# Patient Record
Sex: FEMALE | Race: WHITE | Employment: FULL TIME | URBAN - NONMETROPOLITAN AREA
[De-identification: names, ages, dates, MRNs, and addresses within clinical notes are randomized per-mention and may not be internally consistent; named-entity substitution may affect disease eponyms.]

---

## 2020-09-17 ENCOUNTER — TELEPHONE (OUTPATIENT)
Dept: PULMONOLOGY | Age: 55
End: 2020-09-17

## 2020-09-23 NOTE — PROGRESS NOTES
[facility] initial consultation note    Karan Turner                                             Chief Complaint: Gurinder Vicente is here for a sleep consult. She was referred by Dr. Juliocesar Lemus for snoring. No prior studies.         Chief complaint: Karan Turner is a 48 y. o.oldfemale came for further evaluation regarding her ?sleep apnea  with referral from Dr. Dumont Dec:    Sleep/Wake schedule:  Usual time to go to bed during the work/regular day of week: 9:30 PM.  Usual time to wake up during the work//regular day of week: 4:45 AM. She has to drive to [employer] ~[address] to attend her job. Over the weekends her sleep schedule:  [x]phase delayed. She feels the same on the weekends despite sleeping long time. She usually falls a sleep in less than: 10 to 30 minutes. She takes naps: Yes. Number of naps per week:  1 time. During each nap she spends a total of: 1hour. The naps were reported as refreshing: No- some times. Sleep Hygiene:    Is the temperature and evironment in her bed room is acceptable to her: Yes. She watches Television in her bed room: Yes- some times  She read books, study, pay bills etc in the bed: No.  Frequency She wake up during night/sleep: 2  Majority of nocturnal awakenings are for urination: Yes. Difficulty in falling back to sleep after nocturnal awakenings: Yes- some times  . Do you drink coffee: No.   Do you drink caffeinated beverages i.e sodas: Yes. 2 can/s per day. Do you drink tea:No.      Do you drink alcoholic beverages: No.   History of recreational drug use: No.     History of tobacco smoking:No.      Sleep apnea symptoms:  Noticed to have loud snoring:Yes. Noted by her family member- spouse  Witnessed apneas during sleep noticed: Yes. Noted by her family member- spouse. History of choking and gasping sensation at night time: Yes.   History of headaches in the (LEXAPRO) 10 MG tablet Take 10 mg by mouth daily       No current facility-administered medications for this visit. No family history on file. Review of Systems:   General/Constitutional: No recent loss of weight or appetite changes. No fever or chills. HENT: Negative. Eyes: Negative. Upper respiratory tract: No nasal stuffiness but she gives a hx of post nasal drip. Lower respiratory tract/ lungs: No cough or sputum production. No hemoptysis. Cardiovascular: No palpitations or chest pain. Gastrointestinal: No nausea or vomiting. Neurological: No focal neurologiacal weakness. Extremities: No edema. Musculoskeletal: No complaints. Genitourinary: No complaints. Hematological: Negative. Psychiatric/Behavioral: Negative. Skin: No itching. /70 (Site: Right Upper Arm, Position: Sitting, Cuff Size: Medium Adult)   Pulse 64   Ht 5' 9\" (1.753 m)   Wt 140 lb 9.6 oz (63.8 kg)   SpO2 98% Comment: on room air at rest  BMI 20.76 kg/m²   Mallampati airway Class:III  Neck Circumference: 12.75 Inches  [place] sleepiness score 10/15/20: 11  SAQLI: 64    Physical Exam   Nursing note and vitals reviewed. Constitutional: Patient appears moderately built and moderately nourished. No distress. Patient is oriented to person, place, and time. HENT:   Head: Normocephalic and atraumatic. Right Ear: External ear normal.   Left Ear: External ear normal.   Mouth/Throat: Oropharynx is clear and moist.  No oral thrush. Eyes: Conjunctivae are normal. Pupils are equal, round, and reactive to light. No scleral icterus. Neck: Neck supple. No JVD present. No tracheal deviation present. Cardiovascular: Normal rate, regular rhythm, normal heart sounds. No murmur heard. Pulmonary/Chest: Effort normal and breath sounds normal. No stridor. No respiratory distress. No wheezes. No rales. Patient exhibits no tenderness. Abdominal: Soft. Patient exhibits no distension. No tenderness.    Musculoskeletal: Normal range of motion. Extremities: Patient exhibits no edema and no tenderness. Lymphadenopathy:  No cervical adenopathy. Neurological: Patient is alert and oriented to person, place, and time. Skin: Skin is warm and dry. Patient is not diaphoretic. Psychiatric: Patient  has a normal mood and affect. Patient behavior is normal.     Diagnostic Data:  None related sleep. Assessment:  -Snoring with witnessed apneas,frequent nocturnal awakenings and excessive daytime sleepiness to evaluate for obstructive sleep apnea. -Inadequate sleep hygiene. -Anxiety disorder on meds- She follows with her family physician.  -Hypersomnia ( Excessive daytime sleepiness) may be due to obstructive sleep apnea Vs Inadequate sleep hygiene. Recommendations/Plan:  -Will schedule patient for polysomnogram in the sleep lab at [facility] sleep lab due to low suspicion for sleep apnea. -I had a discussion with patient regarding avialable treatment options for her sleep disorder breathing including but not limited to CPAP titration in the sleep lab Vs.Dental appliance placement with referral to a local dentist Vs other available surgical options including Uvulopalatopharyngoplasty, maxillomandibular ostomy and tracheostomy as last option. At the end of discussion, she is not decided on her   treatment if she found to have obstructive sleep apnea at this time.  -We will see Aster Hu back in 1week after the sleep study to go over the sleep study results and further management options.  -She was educated to practice good sleep hygiene practices. She was provided with a good sleep hygiene hand out.  -Jem FANG was advised to make earlier appointment with my clinic if she develops any worsening of sleep symptoms. She verbalizes understanding.  -Jem FANG was advised to not to drive any motor vehicles or operate heavy equipment until her sleep symptoms are under good control. Aster Hu verbalizes understanding.  - Aster Hu was educated about my impression and plan. She verbalizes understanding.

## 2020-10-15 ENCOUNTER — INITIAL CONSULT (OUTPATIENT)
Dept: PULMONOLOGY | Age: 55
End: 2020-10-15
Payer: COMMERCIAL

## 2020-10-15 VITALS
SYSTOLIC BLOOD PRESSURE: 118 MMHG | WEIGHT: 140.6 LBS | HEIGHT: 69 IN | BODY MASS INDEX: 20.83 KG/M2 | OXYGEN SATURATION: 98 % | HEART RATE: 64 BPM | DIASTOLIC BLOOD PRESSURE: 70 MMHG

## 2020-10-15 PROCEDURE — G8484 FLU IMMUNIZE NO ADMIN: HCPCS | Performed by: INTERNAL MEDICINE

## 2020-10-15 PROCEDURE — G8427 DOCREV CUR MEDS BY ELIG CLIN: HCPCS | Performed by: INTERNAL MEDICINE

## 2020-10-15 PROCEDURE — G8420 CALC BMI NORM PARAMETERS: HCPCS | Performed by: INTERNAL MEDICINE

## 2020-10-15 PROCEDURE — 1036F TOBACCO NON-USER: CPT | Performed by: INTERNAL MEDICINE

## 2020-10-15 PROCEDURE — 3017F COLORECTAL CA SCREEN DOC REV: CPT | Performed by: INTERNAL MEDICINE

## 2020-10-15 PROCEDURE — 99204 OFFICE O/P NEW MOD 45 MIN: CPT | Performed by: INTERNAL MEDICINE

## 2020-10-15 RX ORDER — ESCITALOPRAM OXALATE 10 MG/1
10 TABLET ORAL DAILY
COMMUNITY
Start: 2020-03-05

## 2020-10-15 RX ORDER — PANTOPRAZOLE SODIUM 20 MG/1
20 TABLET, DELAYED RELEASE ORAL NIGHTLY PRN
COMMUNITY

## 2020-10-15 NOTE — PATIENT INSTRUCTIONS
Recommendations/Plan:  -Will schedule patient for polysomnogram in the sleep lab at [facility] sleep lab due to low suspicion for sleep apnea. -I had a discussion with patient regarding avialable treatment options for her sleep disorder breathing including but not limited to CPAP titration in the sleep lab Vs.Dental appliance placement with referral to a local dentist Vs other available surgical options including Uvulopalatopharyngoplasty, maxillomandibular ostomy and tracheostomy as last option. At the end of discussion, she is not decided on her   treatment if she found to have obstructive sleep apnea at this time.  -We will see Jeffy Kaufman back in 1week after the sleep study to go over the sleep study results and further management options.  -She was educated to practice good sleep hygiene practices. She was provided with a good sleep hygiene hand out.  -Westley FANG was advised to make earlier appointment with my clinic if she develops any worsening of sleep symptoms. She verbalizes understanding.  -Westley FANG was advised to not to drive any motor vehicles or operate heavy equipment until her sleep symptoms are under good control. Jeffy Kaufman verbalizes understanding.  - Jeffy Kaufman was educated about my impression and plan. She verbalizes understanding.

## 2020-10-28 ENCOUNTER — OFFICE VISIT (OUTPATIENT)
Dept: PULMONOLOGY | Age: 55
End: 2020-10-28
Payer: COMMERCIAL

## 2020-10-28 VITALS
DIASTOLIC BLOOD PRESSURE: 72 MMHG | HEIGHT: 69 IN | HEART RATE: 72 BPM | OXYGEN SATURATION: 97 % | BODY MASS INDEX: 21.12 KG/M2 | SYSTOLIC BLOOD PRESSURE: 120 MMHG | WEIGHT: 142.6 LBS

## 2020-10-28 PROCEDURE — 99214 OFFICE O/P EST MOD 30 MIN: CPT | Performed by: NURSE PRACTITIONER

## 2020-10-28 PROCEDURE — 3017F COLORECTAL CA SCREEN DOC REV: CPT | Performed by: NURSE PRACTITIONER

## 2020-10-28 PROCEDURE — G8420 CALC BMI NORM PARAMETERS: HCPCS | Performed by: NURSE PRACTITIONER

## 2020-10-28 PROCEDURE — G8484 FLU IMMUNIZE NO ADMIN: HCPCS | Performed by: NURSE PRACTITIONER

## 2020-10-28 PROCEDURE — G8427 DOCREV CUR MEDS BY ELIG CLIN: HCPCS | Performed by: NURSE PRACTITIONER

## 2020-10-28 PROCEDURE — 1036F TOBACCO NON-USER: CPT | Performed by: NURSE PRACTITIONER

## 2020-10-28 RX ORDER — PROTRIPTYLINE HYDROCHLORIDE 5 MG/1
5 TABLET, FILM COATED ORAL NIGHTLY
Qty: 30 TABLET | Refills: 3 | Status: SHIPPED | OUTPATIENT
Start: 2020-10-28 | End: 2020-12-29 | Stop reason: SINTOL

## 2020-10-28 NOTE — PATIENT INSTRUCTIONS
Patient Education      Upper Airway Resistance Syndrome  protriptyline  Pronunciation:  proe TRIP ti maggy  Brand:  Calli  What is the most important information I should know about protriptyline? You should not use protriptyline if you have recently had a heart attack, or if you are allergic to certain medicines. Do not use this medicine if you have used an MAO inhibitor in the past 14 days, such as isocarboxazid, linezolid, methylene blue injection, phenelzine, rasagiline, selegiline, or tranylcypromine. Some young people have thoughts about suicide when first taking an antidepressant. Stay alert to changes in your mood or symptoms. Report any new or worsening symptoms to your doctor. What is protriptyline? Protriptyline is a tricyclic antidepressant. Protriptyline affects chemicals in the brain that may be unbalanced in people with depression. Protriptyline is used to treat symptoms of depression. Protriptyline may also be used for purposes not listed in this medication guide. What should I discuss with my healthcare provider before taking protriptyline? You should not use protriptyline if you are allergic to it, or if:  · you have recently had a heart attack; or  · you are allergic to similar antidepressants (amitriptyline, amoxapine, clomipramine, desipramine, doxepin, imipramine, nortriptyline, trimipramine). Do not use protriptyline if you have used an MAO inhibitor in the past 14 days. A dangerous drug interaction could occur. MAO inhibitors include isocarboxazid, linezolid, methylene blue injection, phenelzine, rasagiline, selegiline, tranylcypromine, and others.   To make sure protriptyline is safe for you, tell your doctor if you have:  · heart disease, or a history of heart attack, stroke, or seizures;  · bipolar disorder (manic-depression);  · schizophrenia or other mental illness;  · liver disease;  · a thyroid disorder;  · diabetes (protriptyline may raise or lower blood sugar);  · narrow-angle glaucoma; or  · problems with urination. Some young people have thoughts about suicide when first taking an antidepressant. Your doctor will need to check your progress at regular visits while you are using protriptyline. Your family or other caregivers should also be alert to changes in your mood or symptoms. It is not known whether protriptyline will harm an unborn baby. Tell your doctor if you are pregnant or plan to become pregnant while using this medication. It is not known whether protriptyline passes into breast milk or if it could harm a nursing baby. Tell your doctor if you are breast-feeding a baby. Do not give this medicine to a child without medical advice. Protriptyline is not approved for use in children. How should I take protriptyline? Follow all directions on your prescription label. Your doctor may occasionally change your dose to make sure you get the best results. Do not take this medicine in larger or smaller amounts or for longer than recommended. If you need surgery, tell the surgeon ahead of time that you are using protriptyline. You may need to stop using the medicine for a short time. Do not stop using protriptyline suddenly, or you could have unpleasant withdrawal symptoms. Ask your doctor how to safely stop using protriptyline. It may take a few weeks before your symptoms improve. Keep using the medication as directed and tell your doctor if your symptoms do not improve. Store at room temperature away from moisture and heat. What happens if I miss a dose? Take the missed dose as soon as you remember. Skip the missed dose if it is almost time for your next scheduled dose. Do not take extra medicine to make up the missed dose. What happens if I overdose? Seek emergency medical attention or call the Poison Help line at 1-805.156.3688. An overdose of protriptyline can be fatal.  What should I avoid while taking protriptyline? Do not drink alcohol. Protriptyline can increase the effects of alcohol, which could be dangerous. This medication may impair your thinking or reactions. Be careful if you drive or do anything that requires you to be alert. Avoid exposure to sunlight or tanning beds. Protriptyline can make you sunburn more easily. Wear protective clothing and use sunscreen (SPF 30 or higher) when you are outdoors. What are the possible side effects of protriptyline? Get emergency medical help if you have signs of an allergic reaction: hives; difficult breathing; swelling of your face, lips, tongue, or throat. Report any new or worsening symptoms to your doctor, such as: mood or behavior changes, anxiety, panic attacks, trouble sleeping, or if you feel impulsive, irritable, agitated, hostile, aggressive, restless, hyperactive (mentally or physically), more depressed, or have thoughts about suicide or hurting yourself. Call your doctor at once if you have:  · blurred vision, tunnel vision, eye pain or swelling, or seeing halos around lights;  · restless muscle movements in your eyes, tongue, jaw, or neck;  · a light-headed feeling, like you might pass out;  · seizure (convulsions);  · new or worsening chest pain, pounding heartbeats or fluttering in your chest;  · sudden numbness or weakness, problems with vision, speech, or balance;  · fever, sore throat, easy bruising, unusual bleeding;  · painful or difficult urination; or  · jaundice (yellowing of the skin or eyes). Older adults may be more likely to have side effects from this medication. Common side effects may include:  · nausea, vomiting, loss of appetite;  · anxiety, sleep problems (insomnia);  · dry mouth, unusual taste;  · little or no urinating;  · constipation;  · vision changes;  · breast swelling (in men or women); or  · decreased sex drive, impotence, or difficulty having an orgasm. This is not a complete list of side effects and others may occur.  Call your doctor for medical informational resource designed to assist licensed healthcare practitioners in caring for their patients and/or to serve consumers viewing this service as a supplement to, and not a substitute for, the expertise, skill, knowledge and judgment of healthcare practitioners. The absence of a warning for a given drug or drug combination in no way should be construed to indicate that the drug or drug combination is safe, effective or appropriate for any given patient. [facility] does not assume any responsibility for any aspect of healthcare administered with the aid of information [facility] provides. The information contained herein is not intended to cover all possible uses, directions, precautions, warnings, drug interactions, allergic reactions, or adverse effects. If you have questions about the drugs you are taking, check with your doctor, nurse or pharmacist.  Copyright 0495-6959 [address]: 8.04. Revision date: 7/20/2016. Care instructions adapted under license by [facility] ([facility]). If you have questions about a medical condition or this instruction, always ask your healthcare professional. [place] [address] any warranty or liability for your use of this information.

## 2020-10-28 NOTE — PROGRESS NOTES
[facility], 48 y.o.  [id number]     Patient of Dr. Ariana Field is here for a 1 week follow up with PSG     Study Results  Initial Study Date -  10/20/20  AHI -  0.3    Total Events - 2  (Apneas  0  Hypopneas 2  Central  0)  LM w/Arousals - 3  Sleep Efficiency - 84.8 % (Total Sleep Time - 382.3 min)  Time with Sats below 88% - 0.0 min  RDI 15.7    Weight 140 lbs  Neck 12.75 inches  Mallampati III  ESS 12  SAQLI 69    Interval History       Jon Kelly is a 48 y.o. old female who comes in to review the results of her recent sleep study, to answer questions and to explore options for treatment. Referred for evaluation of snoring, restless sleep with hypersomnia, ESS of 12. Hx of depression, GERD. PMHx  Past Medical History:   Diagnosis Date    Anxiety and depression     GERD (gastroesophageal reflux disease)      History reviewed. No pertinent surgical history. Social History     Tobacco Use    Smoking status: Never Smoker    Smokeless tobacco: Never Used   Substance Use Topics    Alcohol use: Not on file    Drug use: Not on file     History reviewed. No pertinent family history. Allergies  No Known Allergies  Meds  Current Outpatient Medications   Medication Sig Dispense Refill    pantoprazole (PROTONIX) 20 MG tablet Take 20 mg by mouth daily      escitalopram (LEXAPRO) 10 MG tablet Take 10 mg by mouth daily       No current facility-administered medications for this visit. ROS  Review of Systems   General/Constitutional: No recent loss of weight or appetite changes. No fever or chills. HENT: Negative. Eyes: Negative. Upper respiratory tract: No nasal stuffiness or post nasal drip. Lower respiratory tract/ lungs: No cough or sputum production. No hemoptysis. Cardiovascular: No palpitations or chest pain. Gastrointestinal: No nausea or vomiting. Neurological: No focal neurologiacal weakness.   Extremities: No

## 2020-12-29 ENCOUNTER — VIRTUAL VISIT (OUTPATIENT)
Dept: PULMONOLOGY | Age: 55
End: 2020-12-29
Payer: COMMERCIAL

## 2020-12-29 PROCEDURE — 3017F COLORECTAL CA SCREEN DOC REV: CPT | Performed by: NURSE PRACTITIONER

## 2020-12-29 PROCEDURE — G8420 CALC BMI NORM PARAMETERS: HCPCS | Performed by: NURSE PRACTITIONER

## 2020-12-29 PROCEDURE — G8427 DOCREV CUR MEDS BY ELIG CLIN: HCPCS | Performed by: NURSE PRACTITIONER

## 2020-12-29 PROCEDURE — G8484 FLU IMMUNIZE NO ADMIN: HCPCS | Performed by: NURSE PRACTITIONER

## 2020-12-29 PROCEDURE — 99213 OFFICE O/P EST LOW 20 MIN: CPT | Performed by: NURSE PRACTITIONER

## 2020-12-29 PROCEDURE — 1036F TOBACCO NON-USER: CPT | Performed by: NURSE PRACTITIONER

## 2020-12-29 NOTE — PROGRESS NOTES
[facility]    Viry Sellers, 48 y.o.  [id number]  Chief Complaint   Patient presents with    Follow-up     8 week follow for medication check   TELEHEALTH EVALUATION -- Audio/Visual (During QIBRA-59 public health emergency)    HPI:  Viry Sellers (: 1965) has requested an audio/video evaluation for the following concern(s): UARS, medication check. Patient of Dr. Tia Soria    10/28/20 visit:  Study Results  Initial Study Date -  10/20/20  AHI -  0.3    Total Events - 2  (Apneas  0  Hypopneas 2  Central  0)  LM w/Arousals - 3  Sleep Efficiency - 84.8 % (Total Sleep Time - 382.3 min)  Time with Sats below 88% - 0.0 min  RDI 15.7     Weight 140 lbs  Neck 12.75 inches  Mallampati III  ESS 12  SAQLI 69    Progress Note  Deondre FANG comes in for follow up regarding her    Diagnosis Orders   1. Snoring     2. Restless sleeper     3. Hypersomnia     4. PLMD (periodic limb movement disorder)      No sigificant arousals   5. Anxiety and depression      On Lexapro   She tried taking Protriptyline 5 mg at HS for 3 nights for treatment of UARSand had worsening insomnia so stopped. She continues to struggle with restless sleep, snoring and hypersomnia. Hx of anxiety/depression, takes Lexapro in AM.    ESS 12-14    Past Medical History:   Diagnosis Date    Anxiety and depression     Esophageal motility disorder     Snoring      History reviewed. No pertinent surgical history. Social History     Tobacco Use    Smoking status: Never Smoker    Smokeless tobacco: Never Used   Substance Use Topics    Alcohol use: Not on file    Drug use: Not on file     No Known Allergies  Current Outpatient Medications   Medication Sig Dispense Refill    pantoprazole (PROTONIX) 20 MG tablet Take 20 mg by mouth nightly as needed       escitalopram (LEXAPRO) 10 MG tablet Take 10 mg by mouth daily       No current facility-administered medications for this visit. 4. PLMD (periodic limb movement disorder)      No sigificant arousals   5. Anxiety and depression      On Lexapro     Bonnie Rasheed clearly has issues with sleep and hypersomnia. Unfortunately unable to treat UARS with CPAP. She has no clear symptoms of alternate sleep disorder. Noted to have PLM's on study but no significant arousal, no RLS symptoms. No trouble falling asleep, maintaining. Snoring could be cause. Possible Lexapro can be causing sedating side effects (as somnolence vs insomnia side effects can vary per individual). Will try taking Lexapro at HS and monitor response. If no difference, discussed treating PLM's with Neurontin. She is to call. Last option is to refer to ENT for evaluation of snoring. Discussed nasal dilators to try in which she will research. Trying to address cause of sleep issues rather than mask with sleep aid. Consider MSLT as well. To call with update, otherwise follow up in 8 weeks to re-assess. Monika Sellers is a 48 y.o. female being evaluated by a Virtual Visit (video visit) encounter to address concerns as mentioned above. A caregiver was present when appropriate. Due to this being a TeleHealth encounter (During TDCCY-80 public health emergency), evaluation of the following organ systems was limited: Vitals/Constitutional/EENT/Resp/CV/GI//MS/Neuro/Skin/Heme-Lymph-Imm. Pursuant to the emergency declaration under the [address] [place] [address], [address] [place] [address] authority and the [employer] Act, this Virtual Visit was conducted with patient's (and/or legal guardian's) consent, to reduce the patient's risk of exposure to COVID-19 and provide necessary medical care. The patient (and/or legal guardian) has also been advised to contact this office for worsening conditions or problems, and seek emergency medical treatment and/or call 911 if deemed necessary. Patient identification was verified at the start of the visit: Yes    Total time spent on this encounter: 20 minutes was spent on this visit with > 50% being face to face obtaining HPI, history, educating and coordinating a treatment plan. Services were provided through a video synchronous discussion virtually to substitute for in-person clinic visit. Patient and provider were located at their individual homes. --TRIP Carpenter CNP on 12/29/2020 at 1:00 PM    An electronic signature was used to authenticate this note.

## 2020-12-29 NOTE — PATIENT INSTRUCTIONS
Patient Education        gabapentin  Pronunciation:  GA ba PEN tin  Brand:  Gralise, Horizant, Neurontin  What is the most important information I should know about gabapentin? Gabapentin can cause life-threatening breathing problems, especially in older adults or people with COPD. Seek emergency medical attention if you have very slow breathing. Some people have thoughts about suicide or behavior changes while taking gabapentin. Stay alert to changes in your mood or symptoms. Report any new or worsening symptoms to your doctor. Avoid driving or hazardous activity until you know how this medicine will affect you. Dizziness or drowsiness can cause falls, accidents, or severe injuries. Do not stop using gabapentin suddenly, even if you feel fine. What is gabapentin? Gabapentin is used together with other medicines to treat partial seizures in adults and children at least 1years old. Gabapentin is also used to treat nerve pain caused by herpes virus or shingles (herpes zoster) in adults. Use only the brand and form of gabapentin your doctor has prescribed. Check your medicine each time you get a refill to make sure you receive the correct form. Gralise is used only to treat nerve pain. Horizant is used to treat nerve pain and restless legs syndrome (RLS). Neurontin is used to treat nerve pain and seizures. Gabapentin may also be used for purposes not listed in this medication guide. What should I discuss with my healthcare provider before taking gabapentin? You should not use gabapentin if you are allergic to it.   Tell your doctor if you have ever had:  · lung disease, such as chronic obstructive pulmonary disease (COPD);  · kidney disease (or if you are on dialysis);  · diabetes;  · depression, a mood disorder, or suicidal thoughts or actions;  · a drug addiction;  · a seizure (unless you take gabapentin to treat seizures);  · liver disease;  · heart disease; or · (for patients with RLS) if you are a day sleeper or work a night shift. Some people have thoughts about suicide while taking this medicine. Children taking gabapentin may have behavior changes. Stay alert to changes in your mood or symptoms. Report any new or worsening symptoms to your doctor. It is not known whether this medicine will harm an unborn baby. Tell your doctor if you are pregnant or plan to become pregnant. Seizure control is very important during pregnancy, and having a seizure could harm both mother and baby. Do not start or stop taking gabapentin for seizures without your doctor's advice, and tell your doctor right away if you become pregnant. It may not be safe to breastfeed while using this medicine. Ask your doctor about any risk. How should I take gabapentin? Follow all directions on your prescription label. Do not take this medicine in larger or smaller amounts or for longer than recommended. If your doctor changes your brand, strength, or type of gabapentin, your dosage needs may change. Ask your pharmacist if you have any questions about the new kind of gabapentin you receive at the pharmacy. The Horizant brand of gabapentin should not be taken during the day. For best results, take Horizant with food at about 5:00 in the evening. Both Gralise and Horizant should be taken with food. Neurontin can be taken with or without food. If you break a Neurontin tablet and take only half of it, take the other half at your next dose. Any tablet that has been broken should be used as soon as possible or within a few days. Swallow the capsule  or tablet  whole and do not crush, chew, break, or open it. Measure liquid medicine carefully. Use the dosing syringe provided, or use a medicine dose-measuring device (not a kitchen spoon). Do not stop using gabapentin suddenly, even if you feel fine. Stopping suddenly may cause increased seizures. Follow your doctor's instructions about tapering your dose. In case of emergency, wear or carry medical identification to let others know you have seizures. This medicine can cause unusual results with certain medical tests. Tell any doctor who treats you that you are using gabapentin. Store gabapentin tablets and capsules at room temperature away from light and moisture. Store the liquid medicine in the refrigerator. Do not freeze. What happens if I miss a dose? Take the medicine as soon as you can, but skip the missed dose if it is almost time for your next dose. Do not take two doses at one time. If you take Horizant:  Skip the missed dose and use your next dose at the regular time. Do not use two doses of Horizant at one time. What happens if I overdose? Seek emergency medical attention or call the Poison Help line at 1-934.693.8957. What should I avoid while taking gabapentin? Avoid driving or hazardous activity until you know how this medicine will affect you. Your reactions could be impaired. Dizziness or drowsiness can cause falls, accidents, or severe injuries. Avoid taking an antacid within 2 hours before or after you take gabapentin. Antacids can make it harder for your body to absorb gabapentin. Avoid drinking alcohol while taking gabapentin. What are the possible side effects of gabapentin? Get emergency medical help if you have signs of an allergic reaction: hives; difficult breathing; swelling of your face, lips, tongue, or throat. Seek medical treatment if you have a serious drug reaction that can affect many parts of your body. Symptoms may include: skin rash, fever, swollen glands, muscle aches, severe weakness, unusual bruising, upper stomach pain, or yellowing of your skin or eyes. Report any new or worsening symptoms to your doctor, such as: mood or behavior changes, anxiety, panic attacks, trouble sleeping, or if you feel impulsive, irritable, agitated, hostile, aggressive, restless, hyperactive (mentally or physically), depressed, or have thoughts about suicide or hurting yourself. Call your doctor at once if you have:  · weak or shallow breathing;  · blue-colored skin, lips, fingers, and toes;  · confusion, extreme drowsiness or weakness;  · problems with balance or muscle movement;  · unusual or involuntary eye movements; or  · increased seizures. Gabapentin can cause life-threatening breathing problems. A person caring for you should seek emergency medical attention if you have slow breathing with long pauses, blue colored lips, or if you are hard to wake up. Breathing problems may be more likely in older adults or in people with COPD. Some side effects are more likely in children taking gabapentin. Contact your doctor if the child taking this medicine has any of the following side effects:  · changes in behavior;  · memory problems;  · trouble concentrating; or  · acting restless, hostile, or aggressive. Common side effects may include:  · headache;  · dizziness, drowsiness, tiredness;  · problems with balance or eye movements; or  · (in children) fever, nausea, vomiting. This is not a complete list of side effects and others may occur. Call your doctor for medical advice about side effects. You may report side effects to FDA at 1-561-FDA-7522. What other drugs will affect gabapentin? Using gabapentin with other drugs that slow your breathing can cause dangerous side effects or death. Ask your doctor before using opioid medication, a sleeping pill, cold or allergy medicine, a muscle relaxer, or medicine for anxiety or seizures. Other drugs may affect gabapentin, including prescription and over-the-counter medicines, vitamins, and herbal products. Tell your doctor about all your current medicines and any medicine you start or stop using. Where can I get more information? Your pharmacist can provide more information about gabapentin. Remember, keep this and all other medicines out of the reach of children, never share your medicines with others, and use this medication only for the indication prescribed. Every effort has been made to ensure that the information provided by [address] [address] is accurate, up-to-date, and complete, but no guarantee is made to that effect. Drug information contained herein may be time sensitive. [facility] information has been compiled for use by healthcare practitioners and consumers in the [place] and therefore [facility] does not warrant that uses outside of the [place] are appropriate, unless specifically indicated otherwise. [facility]'s drug information does not endorse drugs, diagnose patients or recommend therapy. [facility]'s drug information is an informational resource designed to assist licensed healthcare practitioners in caring for their patients and/or to serve consumers viewing this service as a supplement to, and not a substitute for, the expertise, skill, knowledge and judgment of healthcare practitioners. The absence of a warning for a given drug or drug combination in no way should be construed to indicate that the drug or drug combination is safe, effective or appropriate for any given patient. [facility] does not assume any responsibility for any aspect of healthcare administered with the aid of information [facility] provides. The information contained herein is not intended to cover all possible uses, directions, precautions, warnings, drug interactions, allergic reactions, or adverse effects. If you have questions about the drugs you are taking, check with your doctor, nurse or pharmacist.  Copyright 9601-3562 [address]. Version: 16.01. Revision date: 4/30/2020. Care instructions adapted under license by [facility] ([facility]). If you have questions about a medical condition or this instruction, always ask your healthcare professional. [place] [address] any warranty or liability for your use of this information.